# Patient Record
Sex: FEMALE | Race: WHITE | NOT HISPANIC OR LATINO
[De-identification: names, ages, dates, MRNs, and addresses within clinical notes are randomized per-mention and may not be internally consistent; named-entity substitution may affect disease eponyms.]

---

## 2017-02-16 ENCOUNTER — APPOINTMENT (OUTPATIENT)
Dept: OPHTHALMOLOGY | Facility: CLINIC | Age: 57
End: 2017-02-16

## 2017-03-07 ENCOUNTER — RX RENEWAL (OUTPATIENT)
Age: 57
End: 2017-03-07

## 2017-04-06 ENCOUNTER — APPOINTMENT (OUTPATIENT)
Dept: OPHTHALMOLOGY | Facility: CLINIC | Age: 57
End: 2017-04-06

## 2017-07-13 ENCOUNTER — APPOINTMENT (OUTPATIENT)
Dept: OPHTHALMOLOGY | Facility: CLINIC | Age: 57
End: 2017-07-13

## 2017-10-06 ENCOUNTER — APPOINTMENT (OUTPATIENT)
Dept: DERMATOLOGY | Facility: CLINIC | Age: 57
End: 2017-10-06
Payer: COMMERCIAL

## 2017-10-06 PROCEDURE — 99203 OFFICE O/P NEW LOW 30 MIN: CPT

## 2017-10-18 ENCOUNTER — RX RENEWAL (OUTPATIENT)
Age: 57
End: 2017-10-18

## 2017-11-09 ENCOUNTER — APPOINTMENT (OUTPATIENT)
Dept: OPHTHALMOLOGY | Facility: CLINIC | Age: 57
End: 2017-11-09
Payer: COMMERCIAL

## 2017-11-09 PROCEDURE — 92014 COMPRE OPH EXAM EST PT 1/>: CPT

## 2017-11-09 PROCEDURE — 92134 CPTRZ OPH DX IMG PST SGM RTA: CPT

## 2018-03-15 ENCOUNTER — APPOINTMENT (OUTPATIENT)
Dept: OPHTHALMOLOGY | Facility: CLINIC | Age: 58
End: 2018-03-15
Payer: COMMERCIAL

## 2018-03-15 DIAGNOSIS — Z79.899 OTHER LONG TERM (CURRENT) DRUG THERAPY: ICD-10-CM

## 2018-03-15 PROCEDURE — 92014 COMPRE OPH EXAM EST PT 1/>: CPT

## 2018-03-15 PROCEDURE — 92134 CPTRZ OPH DX IMG PST SGM RTA: CPT

## 2018-07-12 ENCOUNTER — APPOINTMENT (OUTPATIENT)
Dept: OPHTHALMOLOGY | Facility: CLINIC | Age: 58
End: 2018-07-12
Payer: COMMERCIAL

## 2018-07-12 DIAGNOSIS — H43.393 OTHER VITREOUS OPACITIES, BILATERAL: ICD-10-CM

## 2018-07-12 PROCEDURE — 92134 CPTRZ OPH DX IMG PST SGM RTA: CPT

## 2018-07-12 PROCEDURE — 92012 INTRM OPH EXAM EST PATIENT: CPT

## 2018-10-25 ENCOUNTER — RX RENEWAL (OUTPATIENT)
Age: 58
End: 2018-10-25

## 2018-11-01 ENCOUNTER — APPOINTMENT (OUTPATIENT)
Dept: OPHTHALMOLOGY | Facility: CLINIC | Age: 58
End: 2018-11-01
Payer: COMMERCIAL

## 2018-11-01 DIAGNOSIS — H30.93 UNSPECIFIED CHORIORETINAL INFLAMMATION, BILATERAL: ICD-10-CM

## 2018-11-01 DIAGNOSIS — H26.9 UNSPECIFIED CATARACT: ICD-10-CM

## 2018-11-01 PROCEDURE — 92134 CPTRZ OPH DX IMG PST SGM RTA: CPT

## 2018-11-01 PROCEDURE — 92012 INTRM OPH EXAM EST PATIENT: CPT

## 2019-01-15 ENCOUNTER — RX RENEWAL (OUTPATIENT)
Age: 59
End: 2019-01-15

## 2019-02-07 ENCOUNTER — APPOINTMENT (OUTPATIENT)
Dept: OPHTHALMOLOGY | Facility: CLINIC | Age: 59
End: 2019-02-07

## 2019-04-26 ENCOUNTER — RX RENEWAL (OUTPATIENT)
Age: 59
End: 2019-04-26

## 2019-05-22 ENCOUNTER — RX RENEWAL (OUTPATIENT)
Age: 59
End: 2019-05-22

## 2019-06-12 ENCOUNTER — APPOINTMENT (OUTPATIENT)
Dept: DERMATOLOGY | Facility: CLINIC | Age: 59
End: 2019-06-12

## 2019-06-13 ENCOUNTER — APPOINTMENT (OUTPATIENT)
Dept: OPHTHALMOLOGY | Facility: CLINIC | Age: 59
End: 2019-06-13
Payer: COMMERCIAL

## 2019-06-13 ENCOUNTER — NON-APPOINTMENT (OUTPATIENT)
Age: 59
End: 2019-06-13

## 2019-06-13 PROCEDURE — 92012 INTRM OPH EXAM EST PATIENT: CPT

## 2019-06-28 ENCOUNTER — APPOINTMENT (OUTPATIENT)
Dept: DERMATOLOGY | Facility: CLINIC | Age: 59
End: 2019-06-28
Payer: COMMERCIAL

## 2019-06-28 PROCEDURE — 99214 OFFICE O/P EST MOD 30 MIN: CPT

## 2019-09-19 ENCOUNTER — APPOINTMENT (OUTPATIENT)
Dept: OPHTHALMOLOGY | Facility: CLINIC | Age: 59
End: 2019-09-19
Payer: COMMERCIAL

## 2019-09-19 ENCOUNTER — NON-APPOINTMENT (OUTPATIENT)
Age: 59
End: 2019-09-19

## 2019-09-19 PROCEDURE — 92012 INTRM OPH EXAM EST PATIENT: CPT

## 2019-09-19 PROCEDURE — 92134 CPTRZ OPH DX IMG PST SGM RTA: CPT

## 2019-11-12 ENCOUNTER — NON-APPOINTMENT (OUTPATIENT)
Age: 59
End: 2019-11-12

## 2019-11-12 ENCOUNTER — APPOINTMENT (OUTPATIENT)
Dept: OPHTHALMOLOGY | Facility: CLINIC | Age: 59
End: 2019-11-12
Payer: COMMERCIAL

## 2019-11-12 PROCEDURE — 92014 COMPRE OPH EXAM EST PT 1/>: CPT

## 2020-01-16 ENCOUNTER — NON-APPOINTMENT (OUTPATIENT)
Age: 60
End: 2020-01-16

## 2020-01-16 ENCOUNTER — APPOINTMENT (OUTPATIENT)
Dept: OPHTHALMOLOGY | Facility: CLINIC | Age: 60
End: 2020-01-16
Payer: COMMERCIAL

## 2020-01-16 PROCEDURE — 92134 CPTRZ OPH DX IMG PST SGM RTA: CPT

## 2020-01-16 PROCEDURE — 92014 COMPRE OPH EXAM EST PT 1/>: CPT

## 2020-03-12 ENCOUNTER — NON-APPOINTMENT (OUTPATIENT)
Age: 60
End: 2020-03-12

## 2020-03-12 ENCOUNTER — APPOINTMENT (OUTPATIENT)
Dept: OPHTHALMOLOGY | Facility: CLINIC | Age: 60
End: 2020-03-12
Payer: COMMERCIAL

## 2020-03-12 PROCEDURE — 92012 INTRM OPH EXAM EST PATIENT: CPT

## 2020-03-12 PROCEDURE — 92134 CPTRZ OPH DX IMG PST SGM RTA: CPT

## 2020-04-29 ENCOUNTER — NON-APPOINTMENT (OUTPATIENT)
Age: 60
End: 2020-04-29

## 2020-04-29 ENCOUNTER — APPOINTMENT (OUTPATIENT)
Dept: OPHTHALMOLOGY | Facility: CLINIC | Age: 60
End: 2020-04-29
Payer: COMMERCIAL

## 2020-04-29 PROCEDURE — 99213 OFFICE O/P EST LOW 20 MIN: CPT | Mod: 95

## 2020-06-03 ENCOUNTER — NON-APPOINTMENT (OUTPATIENT)
Age: 60
End: 2020-06-03

## 2020-06-03 ENCOUNTER — APPOINTMENT (OUTPATIENT)
Dept: OPHTHALMOLOGY | Facility: CLINIC | Age: 60
End: 2020-06-03
Payer: COMMERCIAL

## 2020-06-03 PROCEDURE — 99212 OFFICE O/P EST SF 10 MIN: CPT | Mod: 95

## 2020-07-20 ENCOUNTER — APPOINTMENT (OUTPATIENT)
Dept: OPHTHALMOLOGY | Facility: CLINIC | Age: 60
End: 2020-07-20
Payer: COMMERCIAL

## 2020-07-20 ENCOUNTER — NON-APPOINTMENT (OUTPATIENT)
Age: 60
End: 2020-07-20

## 2020-07-20 PROCEDURE — 99441: CPT

## 2020-08-13 ENCOUNTER — RESULT REVIEW (OUTPATIENT)
Age: 60
End: 2020-08-13

## 2020-08-13 ENCOUNTER — APPOINTMENT (OUTPATIENT)
Dept: DERMATOLOGY | Facility: CLINIC | Age: 60
End: 2020-08-13
Payer: COMMERCIAL

## 2020-08-13 PROCEDURE — 11102 TANGNTL BX SKIN SINGLE LES: CPT | Mod: 59

## 2020-08-13 PROCEDURE — 67850 DSTRJ LESION LID MARGIN <1CM: CPT

## 2020-08-13 PROCEDURE — 99214 OFFICE O/P EST MOD 30 MIN: CPT | Mod: 25

## 2020-08-13 PROCEDURE — 17110 DESTRUCTION B9 LES UP TO 14: CPT

## 2020-09-27 ENCOUNTER — TRANSCRIPTION ENCOUNTER (OUTPATIENT)
Age: 60
End: 2020-09-27

## 2020-10-19 ENCOUNTER — APPOINTMENT (OUTPATIENT)
Dept: OPHTHALMOLOGY | Facility: CLINIC | Age: 60
End: 2020-10-19
Payer: COMMERCIAL

## 2020-10-19 ENCOUNTER — NON-APPOINTMENT (OUTPATIENT)
Age: 60
End: 2020-10-19

## 2020-10-19 PROCEDURE — 99441: CPT

## 2021-02-01 ENCOUNTER — APPOINTMENT (OUTPATIENT)
Dept: OPHTHALMOLOGY | Facility: CLINIC | Age: 61
End: 2021-02-01
Payer: COMMERCIAL

## 2021-02-01 ENCOUNTER — NON-APPOINTMENT (OUTPATIENT)
Age: 61
End: 2021-02-01

## 2021-02-01 PROCEDURE — 99442: CPT

## 2021-04-30 ENCOUNTER — APPOINTMENT (OUTPATIENT)
Dept: DERMATOLOGY | Facility: CLINIC | Age: 61
End: 2021-04-30
Payer: COMMERCIAL

## 2021-04-30 PROCEDURE — 99213 OFFICE O/P EST LOW 20 MIN: CPT

## 2021-04-30 PROCEDURE — 99072 ADDL SUPL MATRL&STAF TM PHE: CPT

## 2021-06-03 ENCOUNTER — APPOINTMENT (OUTPATIENT)
Dept: OPHTHALMOLOGY | Facility: CLINIC | Age: 61
End: 2021-06-03

## 2021-06-07 ENCOUNTER — NON-APPOINTMENT (OUTPATIENT)
Age: 61
End: 2021-06-07

## 2021-06-07 ENCOUNTER — APPOINTMENT (OUTPATIENT)
Dept: OPHTHALMOLOGY | Facility: CLINIC | Age: 61
End: 2021-06-07
Payer: COMMERCIAL

## 2021-06-07 PROCEDURE — 99442: CPT

## 2021-09-13 ENCOUNTER — APPOINTMENT (OUTPATIENT)
Dept: GASTROENTEROLOGY | Facility: CLINIC | Age: 61
End: 2021-09-13
Payer: COMMERCIAL

## 2021-09-13 VITALS
SYSTOLIC BLOOD PRESSURE: 100 MMHG | HEART RATE: 80 BPM | WEIGHT: 100 LBS | OXYGEN SATURATION: 99 % | HEIGHT: 65 IN | TEMPERATURE: 97.7 F | DIASTOLIC BLOOD PRESSURE: 60 MMHG | BODY MASS INDEX: 16.66 KG/M2

## 2021-09-13 DIAGNOSIS — Z12.11 ENCOUNTER FOR SCREENING FOR MALIGNANT NEOPLASM OF COLON: ICD-10-CM

## 2021-09-13 PROCEDURE — 99203 OFFICE O/P NEW LOW 30 MIN: CPT

## 2021-09-13 NOTE — ASSESSMENT
[FreeTextEntry1] : This is a 61-year-old female presenting for colon cancer screening evaluation due to family history of colon cancer.  I explained to her the risks, alternatives and benefits to a colonoscopy.  Risk including but not limited to bleeding, perforation, infection and adverse medication reaction.  Questions were answered.  She stated understanding.

## 2021-09-13 NOTE — HISTORY OF PRESENT ILLNESS
[FreeTextEntry1] : This is a pleasant 61-year-old female with history of retinitis presenting for colon cancer screening evaluation.  She has a family history of colon cancer in her mother.  She underwent a colonoscopy May 2016 with benign colon polyp.  She denies abdominal pain, nausea or vomiting.  She denies changes in bowel habits.  She denies rectal bleeding or melena.  She denies weight loss or anemia.

## 2021-09-15 ENCOUNTER — APPOINTMENT (OUTPATIENT)
Dept: DERMATOLOGY | Facility: CLINIC | Age: 61
End: 2021-09-15
Payer: COMMERCIAL

## 2021-09-15 PROCEDURE — 99213 OFFICE O/P EST LOW 20 MIN: CPT

## 2021-10-04 ENCOUNTER — APPOINTMENT (OUTPATIENT)
Dept: OPHTHALMOLOGY | Facility: CLINIC | Age: 61
End: 2021-10-04

## 2021-10-20 ENCOUNTER — APPOINTMENT (OUTPATIENT)
Dept: DISASTER EMERGENCY | Facility: CLINIC | Age: 61
End: 2021-10-20

## 2021-10-25 ENCOUNTER — APPOINTMENT (OUTPATIENT)
Dept: DISASTER EMERGENCY | Facility: CLINIC | Age: 61
End: 2021-10-25

## 2021-10-25 DIAGNOSIS — Z01.818 ENCOUNTER FOR OTHER PREPROCEDURAL EXAMINATION: ICD-10-CM

## 2021-10-25 LAB — SARS-COV-2 N GENE NPH QL NAA+PROBE: NOT DETECTED

## 2021-10-28 ENCOUNTER — APPOINTMENT (OUTPATIENT)
Dept: GASTROENTEROLOGY | Facility: HOSPITAL | Age: 61
End: 2021-10-28

## 2021-10-28 ENCOUNTER — OUTPATIENT (OUTPATIENT)
Dept: OUTPATIENT SERVICES | Facility: HOSPITAL | Age: 61
LOS: 1 days | End: 2021-10-28
Payer: COMMERCIAL

## 2021-10-28 VITALS
DIASTOLIC BLOOD PRESSURE: 75 MMHG | SYSTOLIC BLOOD PRESSURE: 117 MMHG | RESPIRATION RATE: 16 BRPM | HEART RATE: 85 BPM | OXYGEN SATURATION: 99 %

## 2021-10-28 VITALS
TEMPERATURE: 98 F | HEART RATE: 101 BPM | SYSTOLIC BLOOD PRESSURE: 123 MMHG | OXYGEN SATURATION: 100 % | DIASTOLIC BLOOD PRESSURE: 91 MMHG | WEIGHT: 100.97 LBS | RESPIRATION RATE: 17 BRPM | HEIGHT: 61 IN

## 2021-10-28 DIAGNOSIS — Z80.9 FAMILY HISTORY OF MALIGNANT NEOPLASM, UNSPECIFIED: ICD-10-CM

## 2021-10-28 DIAGNOSIS — Z98.890 OTHER SPECIFIED POSTPROCEDURAL STATES: Chronic | ICD-10-CM

## 2021-10-28 DIAGNOSIS — Z12.11 ENCOUNTER FOR SCREENING FOR MALIGNANT NEOPLASM OF COLON: ICD-10-CM

## 2021-10-28 DIAGNOSIS — Z98.49 CATARACT EXTRACTION STATUS, UNSPECIFIED EYE: Chronic | ICD-10-CM

## 2021-10-28 PROCEDURE — G0105: CPT

## 2021-10-28 PROCEDURE — 45378 DIAGNOSTIC COLONOSCOPY: CPT | Mod: GC

## 2021-10-28 RX ORDER — SODIUM CHLORIDE 9 MG/ML
500 INJECTION INTRAMUSCULAR; INTRAVENOUS; SUBCUTANEOUS
Refills: 0 | Status: DISCONTINUED | OUTPATIENT
Start: 2021-10-28 | End: 2021-11-12

## 2021-10-28 NOTE — ASU PATIENT PROFILE, ADULT - NSICDXPASTSURGICALHX_GEN_ALL_CORE_FT
PAST SURGICAL HISTORY:  H/O dilation and curettage     H/O eye surgery     S/P cataract surgery     S/P transesophageal echocardiogram ([facility])

## 2021-10-28 NOTE — ASU PATIENT PROFILE, ADULT - NSICDXPASTMEDICALHX_GEN_ALL_CORE_FT
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

## 2021-10-28 NOTE — PRE PROCEDURE NOTE - PRE PROCEDURE EVALUATION
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    S/P cataract surgery    S/P transesophageal echocardiogram ([facility])    H/O dilation and curettage    H/O eye surgery      ALLERGIES:  Allergy Status Unknown    HOME MEDICATIONS:  Cranberry: 650  orally 2 times a day  Estradiol Patch 0.025 mg/24 hours weekly transdermal film, extended release: 1 patch transdermal once a week  Low Dose ASA 81 mg oral tablet:   Metamucil: 5  orally 3 times a day  mycophenolate mofetil 500 mg oral tablet: 2 tab(s) orally 2 times a day  progesterone 100 mg oral capsule: orally once a day  SUMAtriptan 50 mg oral tablet: 1 tab(s) orally once, As Needed  vit c: 500   once a day  vit d: 2000    AICD/PPM: [ ] yes   [ ] no    PERTINENT LAB DATA:                      PHYSICAL EXAMINATION:    Height (cm): 154.9  Weight (kg): 45.8  BMI (kg/m2): 19.1  BSA (m2): 1.41T(C): 36.7  HR: 101  BP: 123/91  RR: 17  SpO2: 100%    Constitutional: NAD  HEENT: PERRLA, EOMI,    Neck:  No JVD  Respiratory: CTAB/L  Cardiovascular: S1 and S2  Gastrointestinal: BS+, soft, NT/ND  Extremities: No peripheral edema  Neurological: A/O x 3, no focal deficits  Psychiatric: Normal mood, normal affect  Skin: No rashes    ASA Class: I [ ]  II [ ]  III [ ]  IV [ ]    COMMENTS:    The patient is a suitable candidate for the planned procedure unless box checked [ ]  No, explain:

## 2021-11-09 PROBLEM — C44.91 BASAL CELL CARCINOMA OF SKIN, UNSPECIFIED: Chronic | Status: ACTIVE | Noted: 2021-10-28

## 2021-11-09 PROBLEM — M06.9 RHEUMATOID ARTHRITIS, UNSPECIFIED: Chronic | Status: ACTIVE | Noted: 2021-10-28

## 2021-12-09 ENCOUNTER — APPOINTMENT (OUTPATIENT)
Dept: OPHTHALMOLOGY | Facility: CLINIC | Age: 61
End: 2021-12-09
Payer: COMMERCIAL

## 2021-12-09 ENCOUNTER — NON-APPOINTMENT (OUTPATIENT)
Age: 61
End: 2021-12-09

## 2021-12-09 PROCEDURE — 92134 CPTRZ OPH DX IMG PST SGM RTA: CPT

## 2021-12-09 PROCEDURE — 92014 COMPRE OPH EXAM EST PT 1/>: CPT

## 2022-02-20 ENCOUNTER — NON-APPOINTMENT (OUTPATIENT)
Age: 62
End: 2022-02-20

## 2022-02-22 PROBLEM — Z80.3 FAMILY HISTORY OF MALIGNANT NEOPLASM OF BREAST: Status: ACTIVE | Noted: 2022-02-22

## 2022-02-22 PROBLEM — Z80.41 FAMILY HISTORY OF MALIGNANT NEOPLASM OF OVARY: Status: ACTIVE | Noted: 2022-02-22

## 2022-02-22 RX ORDER — MULTIVIT-MIN/IRON/FOLIC ACID/K 18-600-40
CAPSULE ORAL
Refills: 0 | Status: DISCONTINUED | COMMUNITY
End: 2022-02-22

## 2022-02-22 RX ORDER — DOXYCYCLINE 150 MG/1
CAPSULE ORAL
Refills: 0 | Status: DISCONTINUED | COMMUNITY
End: 2022-02-22

## 2022-02-22 RX ORDER — MYCOPHENOLATE MOFETIL 500 MG/1
500 TABLET ORAL DAILY
Qty: 90 | Refills: 5 | Status: DISCONTINUED | COMMUNITY
Start: 2017-03-07 | End: 2022-02-22

## 2022-02-22 RX ORDER — ESTRADIOL 0.03 MG/D
0.03 PATCH, EXTENDED RELEASE TRANSDERMAL
Refills: 0 | Status: DISCONTINUED | COMMUNITY
End: 2022-02-22

## 2022-02-23 ENCOUNTER — APPOINTMENT (OUTPATIENT)
Dept: GYNECOLOGIC ONCOLOGY | Facility: CLINIC | Age: 62
End: 2022-02-23
Payer: COMMERCIAL

## 2022-02-23 DIAGNOSIS — Z80.3 FAMILY HISTORY OF MALIGNANT NEOPLASM OF BREAST: ICD-10-CM

## 2022-02-23 DIAGNOSIS — Z80.41 FAMILY HISTORY OF MALIGNANT NEOPLASM OF OVARY: ICD-10-CM

## 2022-02-23 PROCEDURE — 76857 US EXAM PELVIC LIMITED: CPT | Mod: 59

## 2022-02-23 PROCEDURE — 76830 TRANSVAGINAL US NON-OB: CPT | Mod: 59

## 2022-02-23 PROCEDURE — 99204 OFFICE O/P NEW MOD 45 MIN: CPT | Mod: 25

## 2022-02-28 NOTE — PHYSICAL EXAM
[Chaperone Present] : A chaperone was present in the examining room during all aspects of the physical examination [Normal] : Bimanual Exam: Normal [FreeTextEntry1] : Shaniqua Simental PA-C

## 2022-02-28 NOTE — END OF VISIT
[FreeTextEntry3] : This note accurately reflects the work and decisions made by me.\par Written by Shaniqua Simental PA-C acting as a scribe for Dr. Adarsh Botello.

## 2022-02-28 NOTE — ASSESSMENT
[FreeTextEntry1] : 60 y/o with history of HRT with polypoid endometrium with simple and complex hyperplasia and focal cytologic atypia on Endometrial polyp. \par \par I discussed at length with the patient the nature, purpose, risks, benefits, and alternatives of Robot assisted total laparoscopic hysterectomy with bilateral salpingo-oophorectomy, retroperitoneal lymphadenectomy.  The patient understands the risks to include,but not be limited to, bowel injury, bleeding (with the possible need for transfusion), bladder or ureteral injury, infections, deep venous thrombosis, and mt-operative death.  The patient also understands that her surgery may not be able to be performed robotically and that she may need a laparotomy.  She also understands the limitations of robotic surgery and the possibility of missing a surgical complication with need for subsequent re-exploration.  She agrees to proceed.  She asked numerous questions which were answered to her satisfaction.  She understands the need for a pre-operative bowel preparation and agrees to comply with our instructions.  She also understands the rationale for a cystoscopy at the completion of the procedure and the potential risks of cystoscopy.  The patient also understands the possible limitations of robotic staging compared to a laparotomy approach.

## 2022-02-28 NOTE — HISTORY OF PRESENT ILLNESS
[FreeTextEntry1] : 62 y/o  female, LMP 2008 being referred by Dr. Jain for simple and complex hyperplasia. Patient reports she had a history of HRT (currently discontinued) for treatment of rare retinal eye disorder, which she was taking estradiol and progesterone for which she was getting yearly US. She reports 2 benign endometrial polypectomy last year. US  revealed thickened area, suspected endometrial polyp. On 22 she underwent fractional D&C, hysteroscopy and myosure excision of endometrial polyp. ECC - negative. EMC - weak proliferative endometrium. Myosure - polypoid endometrium with simple and complex hyperplasia with focal cytologic atypia. Denies vaginal bleeding/discharge, abdominal pain/bloating/distension, pelvis discomfort, changes in normal bowel/urinary habits, nausea/vomiting, unintentional weight loss/gain, and all other associated signs and symptoms. She is currently sexually active, denies pain and/or bleeding. 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 Patient is Ashkenazi [employer] heritage. \par \par Health Screening:\par Last Pap: 2021; normal as per patient \par Last Mammogram/US: ; normal. Patient reports history of enlarged right axillary lymph node which is stable as well as left breast calcifications.  \par Last Colonoscopy: 10/2021; normal as per patient \par Last Dexa:  ; osteopenia

## 2022-02-28 NOTE — CHIEF COMPLAINT
[FreeTextEntry1] : [place] [facility]\par \par [facility] [phone] at [address] [place] [address]

## 2022-03-04 ENCOUNTER — OUTPATIENT (OUTPATIENT)
Dept: OUTPATIENT SERVICES | Facility: HOSPITAL | Age: 62
LOS: 1 days | End: 2022-03-04
Payer: COMMERCIAL

## 2022-03-04 DIAGNOSIS — Z98.890 OTHER SPECIFIED POSTPROCEDURAL STATES: Chronic | ICD-10-CM

## 2022-03-04 DIAGNOSIS — Z98.49 CATARACT EXTRACTION STATUS, UNSPECIFIED EYE: Chronic | ICD-10-CM

## 2022-03-04 DIAGNOSIS — N85.02 ENDOMETRIAL INTRAEPITHELIAL NEOPLASIA [EIN]: ICD-10-CM

## 2022-03-07 ENCOUNTER — RESULT REVIEW (OUTPATIENT)
Age: 62
End: 2022-03-07

## 2022-03-07 PROCEDURE — 88321 CONSLTJ&REPRT SLD PREP ELSWR: CPT

## 2022-03-08 ENCOUNTER — OUTPATIENT (OUTPATIENT)
Dept: OUTPATIENT SERVICES | Facility: HOSPITAL | Age: 62
LOS: 1 days | End: 2022-03-08
Payer: COMMERCIAL

## 2022-03-08 VITALS
WEIGHT: 106.92 LBS | HEIGHT: 61 IN | DIASTOLIC BLOOD PRESSURE: 58 MMHG | RESPIRATION RATE: 16 BRPM | SYSTOLIC BLOOD PRESSURE: 99 MMHG | TEMPERATURE: 97 F | HEART RATE: 88 BPM

## 2022-03-08 DIAGNOSIS — Z98.890 OTHER SPECIFIED POSTPROCEDURAL STATES: Chronic | ICD-10-CM

## 2022-03-08 DIAGNOSIS — Z29.9 ENCOUNTER FOR PROPHYLACTIC MEASURES, UNSPECIFIED: ICD-10-CM

## 2022-03-08 DIAGNOSIS — N85.02 ENDOMETRIAL INTRAEPITHELIAL NEOPLASIA [EIN]: ICD-10-CM

## 2022-03-08 DIAGNOSIS — Z98.49 CATARACT EXTRACTION STATUS, UNSPECIFIED EYE: Chronic | ICD-10-CM

## 2022-03-08 DIAGNOSIS — Z01.818 ENCOUNTER FOR OTHER PREPROCEDURAL EXAMINATION: ICD-10-CM

## 2022-03-08 LAB
A1C WITH ESTIMATED AVERAGE GLUCOSE RESULT: 5.1 % — SIGNIFICANT CHANGE UP (ref 4–5.6)
ANION GAP SERPL CALC-SCNC: 14 MMOL/L — SIGNIFICANT CHANGE UP (ref 5–17)
BASOPHILS # BLD AUTO: 0.03 K/UL — SIGNIFICANT CHANGE UP (ref 0–0.2)
BASOPHILS NFR BLD AUTO: 0.5 % — SIGNIFICANT CHANGE UP (ref 0–2)
BLD GP AB SCN SERPL QL: SIGNIFICANT CHANGE UP
BUN SERPL-MCNC: 8.1 MG/DL — SIGNIFICANT CHANGE UP (ref 8–20)
CALCIUM SERPL-MCNC: 9.4 MG/DL — SIGNIFICANT CHANGE UP (ref 8.6–10.2)
CHLORIDE SERPL-SCNC: 102 MMOL/L — SIGNIFICANT CHANGE UP (ref 98–107)
CO2 SERPL-SCNC: 26 MMOL/L — SIGNIFICANT CHANGE UP (ref 22–29)
CREAT SERPL-MCNC: 0.63 MG/DL — SIGNIFICANT CHANGE UP (ref 0.5–1.3)
EGFR: 100 ML/MIN/1.73M2 — SIGNIFICANT CHANGE UP
EOSINOPHIL # BLD AUTO: 0.03 K/UL — SIGNIFICANT CHANGE UP (ref 0–0.5)
EOSINOPHIL NFR BLD AUTO: 0.5 % — SIGNIFICANT CHANGE UP (ref 0–6)
ESTIMATED AVERAGE GLUCOSE: 100 MG/DL — SIGNIFICANT CHANGE UP (ref 68–114)
GLUCOSE SERPL-MCNC: 97 MG/DL — SIGNIFICANT CHANGE UP (ref 70–99)
HCT VFR BLD CALC: 37.5 % — SIGNIFICANT CHANGE UP (ref 34.5–45)
HGB BLD-MCNC: 12.6 G/DL — SIGNIFICANT CHANGE UP (ref 11.5–15.5)
IMM GRANULOCYTES NFR BLD AUTO: 0.2 % — SIGNIFICANT CHANGE UP (ref 0–1.5)
LYMPHOCYTES # BLD AUTO: 2.14 K/UL — SIGNIFICANT CHANGE UP (ref 1–3.3)
LYMPHOCYTES # BLD AUTO: 37.5 % — SIGNIFICANT CHANGE UP (ref 13–44)
MCHC RBC-ENTMCNC: 28.8 PG — SIGNIFICANT CHANGE UP (ref 27–34)
MCHC RBC-ENTMCNC: 33.6 GM/DL — SIGNIFICANT CHANGE UP (ref 32–36)
MCV RBC AUTO: 85.6 FL — SIGNIFICANT CHANGE UP (ref 80–100)
MONOCYTES # BLD AUTO: 0.59 K/UL — SIGNIFICANT CHANGE UP (ref 0–0.9)
MONOCYTES NFR BLD AUTO: 10.4 % — SIGNIFICANT CHANGE UP (ref 2–14)
NEUTROPHILS # BLD AUTO: 2.9 K/UL — SIGNIFICANT CHANGE UP (ref 1.8–7.4)
NEUTROPHILS NFR BLD AUTO: 50.9 % — SIGNIFICANT CHANGE UP (ref 43–77)
PLATELET # BLD AUTO: 258 K/UL — SIGNIFICANT CHANGE UP (ref 150–400)
POTASSIUM SERPL-MCNC: 4.3 MMOL/L — SIGNIFICANT CHANGE UP (ref 3.5–5.3)
POTASSIUM SERPL-SCNC: 4.3 MMOL/L — SIGNIFICANT CHANGE UP (ref 3.5–5.3)
RBC # BLD: 4.38 M/UL — SIGNIFICANT CHANGE UP (ref 3.8–5.2)
RBC # FLD: 11.8 % — SIGNIFICANT CHANGE UP (ref 10.3–14.5)
SODIUM SERPL-SCNC: 142 MMOL/L — SIGNIFICANT CHANGE UP (ref 135–145)
SURGICAL PATHOLOGY STUDY: SIGNIFICANT CHANGE UP
WBC # BLD: 5.7 K/UL — SIGNIFICANT CHANGE UP (ref 3.8–10.5)
WBC # FLD AUTO: 5.7 K/UL — SIGNIFICANT CHANGE UP (ref 3.8–10.5)

## 2022-03-08 PROCEDURE — 93005 ELECTROCARDIOGRAM TRACING: CPT

## 2022-03-08 PROCEDURE — G0463: CPT

## 2022-03-08 PROCEDURE — 93010 ELECTROCARDIOGRAM REPORT: CPT

## 2022-03-08 RX ORDER — MYCOPHENOLATE MOFETIL 250 MG/1
2 CAPSULE ORAL
Qty: 0 | Refills: 0 | DISCHARGE

## 2022-03-08 RX ORDER — PROGESTERONE 200 MG/1
0 CAPSULE, LIQUID FILLED ORAL
Qty: 0 | Refills: 0 | DISCHARGE

## 2022-03-08 RX ORDER — ASPIRIN/CALCIUM CARB/MAGNESIUM 324 MG
0 TABLET ORAL
Qty: 0 | Refills: 0 | DISCHARGE

## 2022-03-08 NOTE — H&P PST ADULT - ASSESSMENT
Pt 62 y/o  female, LMP , seen today pre-op for Robotic assisted total laparoscopic hysterectomy, bilateral salpingo-oophorectomy, sentinel lymph node mapping, cystoscopy , Robotic code. Pt was referred by her OB-GYN, Dr. Jain for simple and complex hyperplasia. Pt report she had a history of HRT (which is currently discontinued) for treatment of rare retinal eye disorder, which she was taking estradiol and progesterone. Subsequently Pt was getting yearly US. On  revealed thickened area, suspected endometrial polyp.  On 22 Pt underwent  D&C, hysteroscopy and Myosure excision of endometrial polyp. Myosure - polypoid endometrium with simple and complex hyperplasia with focal cytologic atypia. Pt to denies vaginal bleeding and discharge, denies abdominal pain/bloating/ pelvis discomfort, denies dyspareunia,  denies changes in normal bowel and bladder habits, denies nausea/vomiting, denies unintentional weight loss and any other related issues. Pt seen today for a scheduled procedure on 3/22/22 with Dr. Botello. Surgery protocol reviewed with pt today. Pt to follow-up with PCP for medical clearance and her Ophthalmologist dr Ramírez regarding recommendation of Mycophenolate prior to this procedure             Pt 60 y/o  female, LMP , seen today pre-op for Robotic assisted total laparoscopic hysterectomy, bilateral salpingo-oophorectomy, sentinel lymph node mapping, cystoscopy , Robotic code. Pt was referred by her OB-GYN, Dr. Jain for simple and complex hyperplasia. Pt report she had a history of HRT (which is currently discontinued) for treatment of rare retinal eye disorder, which she was taking estradiol and progesterone. Subsequently Pt was getting yearly US. On  revealed thickened area, suspected endometrial polyp.  On 22 Pt underwent  D&C, hysteroscopy and Myosure excision of endometrial polyp. Myosure - polypoid endometrium with simple and complex hyperplasia with focal cytologic atypia. Pt to denies vaginal bleeding and discharge, denies abdominal pain/bloating/ pelvis discomfort, denies dyspareunia,  denies changes in normal bowel and bladder habits, denies nausea/vomiting, denies unintentional weight loss and any other related issues. Pt seen today for a scheduled procedure on 3/22/22 with Dr. Botello. Surgery protocol reviewed with pt today. Pt to follow-up with PCP for medical clearance and her Ophthalmologist dr Ramírez regarding recommendation of Mycophenolate prior to this procedure                   Pt 62 y/o  female, LMP , seen today pre-op for Robotic assisted total laparoscopic hysterectomy, bilateral salpingo-oophorectomy, sentinel lymph node mapping, cystoscopy , Robotic code. Pt was referred by her OB-GYN, Dr. Jain for simple and complex hyperplasia. Pt report she had a history of HRT (which is currently discontinued) for treatment of rare retinal eye disorder, which she was taking estradiol and progesterone. Subsequently Pt was getting yearly US. On  revealed thickened area, suspected endometrial polyp.  On 22 Pt underwent  D&C, hysteroscopy and Myosure excision of endometrial polyp. Myosure - polypoid endometrium with simple and complex hyperplasia with focal cytologic atypia. Pt to denies vaginal bleeding and discharge, denies abdominal pain/bloating/ pelvis discomfort, denies dyspareunia,  denies changes in normal bowel and bladder habits, denies nausea/vomiting, denies unintentional weight loss and any other related issues. Pt seen today for a scheduled procedure on 3/22/22 with Dr. Botello. Surgery protocol reviewed with pt today. Pt to follow-up with PCP for medical clearance and her Ophthalmologist dr Ramírez regarding recommendation of Mycophenolate prior to this procedure   CAPRINI VTE 2.0 SCORE [CLOT updated 2019]    AGE RELATED RISK FACTORS                                                       MOBILITY RELATED FACTORS  [ ] Age 41-60 years                                            (1 Point)                    [ ] Bed rest                                                        (1 Point)  [x ] Age: 61-74 years                                           (2 Points)                  [ ] Plaster cast                                                   (2 Points)  [ ] Age= 75 years                                              (3 Points)                    [ ] Bed bound for more than 72 hours                 (2 Points)    DISEASE RELATED RISK FACTORS                                               GENDER SPECIFIC FACTORS  [ ] Edema in the lower extremities                       (1 Point)              [ ] Pregnancy                                                     (1 Point)  [ ] Varicose veins                                               (1 Point)                     [ ] Post-partum < 6 weeks                                   (1 Point)             [x ] BMI > 25 Kg/m2                                            (1 Point)                     [ ] Hormonal therapy  or oral contraception          (1 Point)                 [ ] Sepsis (in the previous month)                        (1 Point)               [ ] History of pregnancy complications                 (1 point)  [ ] Pneumonia or serious lung disease                                               [ ] Unexplained or recurrent                     (1 Point)           (in the previous month)                               (1 Point)  [ ] Abnormal pulmonary function test                     (1 Point)                 SURGERY RELATED RISK FACTORS  [ ] Acute myocardial infarction                              (1 Point)               [ ]  Section                                             (1 Point)  [ ] Congestive heart failure (in the previous month)  (1 Point)      [ ] Minor surgery                                                  (1 Point)   [ ] Inflammatory bowel disease                             (1 Point)               [ ] Arthroscopic surgery                                        (2 Points)  [ ] Central venous access                                      (2 Points)                [ x] General surgery lasting more than 45 minutes (2 points)  [ ] Malignancy- Present or previous                   (2 Points)                [ ] Elective arthroplasty                                         (5 points)    [ ] Stroke (in the previous month)                          (5 Points)                                                                                                                                                           HEMATOLOGY RELATED FACTORS                                                 TRAUMA RELATED RISK FACTORS  [ ] Prior episodes of VTE                                     (3 Points)                [ ] Fracture of the hip, pelvis, or leg                       (5 Points)  [ ] Positive family history for VTE                         (3 Points)             [ ] Acute spinal cord injury (in the previous month)  (5 Points)  [ ] Prothrombin 48370 A                                     (3 Points)               [ ] Paralysis  (less than 1 month)                             (5 Points)  [ ] Factor V Leiden                                             (3 Points)                  [ ] Multiple Trauma within 1 month                        (5 Points)  [ ] Lupus anticoagulants                                     (3 Points)                                                           [ ] Anticardiolipin antibodies                               (3 Points)                                                       [ ] High homocysteine in the blood                      (3 Points)                                             [ ] Other congenital or acquired thrombophilia      (3 Points)                                                [ ] Heparin induced thrombocytopenia                  (3 Points)                                     Total Score [     5     ]  OPIOID RISK TOOL    SEEMA EACH BOX THAT APPLIES AND ADD TOTALS AT THE END    FAMILY HISTORY OF SUBSTANCE ABUSE                 FEMALE         MALE                                                Alcohol                             [  ]1 pt          [  ]3pts                                               Illegal Durgs                     [  ]2 pts        [  ]3pts                                               Rx Drugs                           [  ]4 pts        [  ]4 pts    PERSONAL HISTORY OF SUBSTANCE ABUSE                                                                                          Alcohol                             [  ]3 pts       [  ]3 pts                                               Illegal Drugs                     [  ]4 pts        [  ]4 pts                                               Rx Drugs                           [  ]5 pts        [  ]5 pts    AGE BETWEEN 16-45 YEARS                                      [  ]1 pt         [  ]1 pt    HISTORY OF PREADOLESCENT   SEXUAL ABUSE                                                             [  ]3 pts        [  ]0pts    PSYCHOLOGICAL DISEASE                     ADD, OCD, Bipolar, Schizophrenia        [  ]2 pts         [  ]2 pts                      Depression                                               [  ]1 pt           [  ]1 pt           SCORING TOTAL   (add numbers and type here)              (**0*)                                     A score of 3 or lower indicated LOW risk for future opioid abuse  A score of 4 to 7 indicated moderate risk for future opioid abuse  A score of 8 or higher indicates a high risk for opioid abuse                  Pt 62 y/o  female, LMP 2008, seen today pre-op for Robotic assisted total laparoscopic hysterectomy, bilateral salpingo-oophorectomy, sentinel lymph node mapping, cystoscopy , Robotic code. Pt was referred by her OB-GYN, Dr. Jain for simple and complex hyperplasia. Pt report she had a history of HRT (which is currently discontinued) for treatment of rare retinal eye disorder, which she was taking estradiol and progesterone. Subsequently Pt was getting yearly US. On  revealed thickened area, suspected endometrial polyp.  On 22 Pt underwent  D&C, hysteroscopy and Myosure excision of endometrial polyp. Myosure - polypoid endometrium with simple and complex hyperplasia with focal cytologic atypia. Pt today denies vaginal bleeding and discharge, denies abdominal pain/bloating/ pelvis discomfort, denies dyspareunia,  denies changes in normal bowel and bladder habits, denies nausea/vomiting, denies unintentional weight loss and any other related issues. Pt seen today for a scheduled procedure on 3/22/22 with Dr. Botello. Surgery protocol reviewed with pt today. Pt to follow-up with PCP for medical clearance and her Ophthalmologist dr Ramírez regarding recommendation of Mycophenolate prior to this procedure   CAPRINI VTE 2.0 SCORE [CLOT updated 2019]    AGE RELATED RISK FACTORS                                                       MOBILITY RELATED FACTORS  [ ] Age 41-60 years                                            (1 Point)                    [ ] Bed rest                                                        (1 Point)  [x ] Age: 61-74 years                                           (2 Points)                  [ ] Plaster cast                                                   (2 Points)  [ ] Age= 75 years                                              (3 Points)                    [ ] Bed bound for more than 72 hours                 (2 Points)    DISEASE RELATED RISK FACTORS                                               GENDER SPECIFIC FACTORS  [ ] Edema in the lower extremities                       (1 Point)              [ ] Pregnancy                                                     (1 Point)  [ ] Varicose veins                                               (1 Point)                     [ ] Post-partum < 6 weeks                                   (1 Point)             [x ] BMI > 25 Kg/m2                                            (1 Point)                     [ ] Hormonal therapy  or oral contraception          (1 Point)                 [ ] Sepsis (in the previous month)                        (1 Point)               [ ] History of pregnancy complications                 (1 point)  [ ] Pneumonia or serious lung disease                                               [ ] Unexplained or recurrent                     (1 Point)           (in the previous month)                               (1 Point)  [ ] Abnormal pulmonary function test                     (1 Point)                 SURGERY RELATED RISK FACTORS  [ ] Acute myocardial infarction                              (1 Point)               [ ]  Section                                             (1 Point)  [ ] Congestive heart failure (in the previous month)  (1 Point)      [ ] Minor surgery                                                  (1 Point)   [ ] Inflammatory bowel disease                             (1 Point)               [ ] Arthroscopic surgery                                        (2 Points)  [ ] Central venous access                                      (2 Points)                [ x] General surgery lasting more than 45 minutes (2 points)  [ ] Malignancy- Present or previous                   (2 Points)                [ ] Elective arthroplasty                                         (5 points)    [ ] Stroke (in the previous month)                          (5 Points)                                                                                                                                                           HEMATOLOGY RELATED FACTORS                                                 TRAUMA RELATED RISK FACTORS  [ ] Prior episodes of VTE                                     (3 Points)                [ ] Fracture of the hip, pelvis, or leg                       (5 Points)  [ ] Positive family history for VTE                         (3 Points)             [ ] Acute spinal cord injury (in the previous month)  (5 Points)  [ ] Prothrombin 46471 A                                     (3 Points)               [ ] Paralysis  (less than 1 month)                             (5 Points)  [ ] Factor V Leiden                                             (3 Points)                  [ ] Multiple Trauma within 1 month                        (5 Points)  [ ] Lupus anticoagulants                                     (3 Points)                                                           [ ] Anticardiolipin antibodies                               (3 Points)                                                       [ ] High homocysteine in the blood                      (3 Points)                                             [ ] Other congenital or acquired thrombophilia      (3 Points)                                                [ ] Heparin induced thrombocytopenia                  (3 Points)                                     Total Score [     5     ]  OPIOID RISK TOOL    SEEMA EACH BOX THAT APPLIES AND ADD TOTALS AT THE END    FAMILY HISTORY OF SUBSTANCE ABUSE                 FEMALE         MALE                                                Alcohol                             [  ]1 pt          [  ]3pts                                               Illegal Durgs                     [  ]2 pts        [  ]3pts                                               Rx Drugs                           [  ]4 pts        [  ]4 pts    PERSONAL HISTORY OF SUBSTANCE ABUSE                                                                                          Alcohol                             [  ]3 pts       [  ]3 pts                                               Illegal Drugs                     [  ]4 pts        [  ]4 pts                                               Rx Drugs                           [  ]5 pts        [  ]5 pts    AGE BETWEEN 16-45 YEARS                                      [  ]1 pt         [  ]1 pt    HISTORY OF PREADOLESCENT   SEXUAL ABUSE                                                             [  ]3 pts        [  ]0pts    PSYCHOLOGICAL DISEASE                     ADD, OCD, Bipolar, Schizophrenia        [  ]2 pts         [  ]2 pts                      Depression                                               [  ]1 pt           [  ]1 pt           SCORING TOTAL   (add numbers and type here)              (**0*)                                     A score of 3 or lower indicated LOW risk for future opioid abuse  A score of 4 to 7 indicated moderate risk for future opioid abuse  A score of 8 or higher indicates a high risk for opioid abuse

## 2022-03-08 NOTE — H&P PST ADULT - NSICDXPASTMEDICALHX_GEN_ALL_CORE_FT
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

## 2022-03-08 NOTE — H&P PST ADULT - SOURCE OF INFORMATION, PROFILE
Dr Lemon Dr Lemon RN, surgery PA, CM RN, surgery team patient pt pt rn, reza and patient pt, CM, RN patient

## 2022-03-08 NOTE — PATIENT PROFILE ADULT - FALL HARM RISK - UNIVERSAL INTERVENTIONS
Bed in lowest position, wheels locked, appropriate side rails in place/Call bell, personal items and telephone in reach/Instruct patient to call for assistance before getting out of bed or chair/Non-slip footwear when patient is out of bed/[place] to call system/Physically safe environment - no spills, clutter or unnecessary equipment/Purposeful Proactive Rounding/Room/bathroom lighting operational, light cord in reach

## 2022-03-08 NOTE — H&P PST ADULT - PROBLEM SELECTOR PLAN 2
Robotic assisted total laparoscopic hysterectomy, bilateral salpingo-oophorectomy, sentinel lymph node mapping, cystoscopy , Robotic code. F/U with PCP for medical clearance

## 2022-03-08 NOTE — H&P PST ADULT - NSICDXFAMILYHX_GEN_ALL_CORE_FT
FAMILY HISTORY:  Mother  Still living? Unknown  FHx: colon cancer, Age at diagnosis: Age Unknown  FHx: Crohn's disease, Age at diagnosis: Age Unknown    Grandparent  Still living? Unknown  Family history of breast cancer, Age at diagnosis: Age Unknown    Aunt  Still living? Unknown  Family history of breast cancer, Age at diagnosis: Age Unknown

## 2022-03-08 NOTE — H&P PST ADULT - HISTORY OF PRESENT ILLNESS
Pt 60 y/o  female, LMP , seen today pre-op for Robotic assisted total laparoscopic hysterectomy, bilateral salpingo-oophorectomy, sentinel lymph node mapping, cystoscopy , Robotic code. Pt was referred by her OB-GYN, Dr. Jain for simple and complex hyperplasia. Pt report she had a history of HRT (which is currently discontinued) for treatment of rare retinal eye disorder, which she was taking estradiol and progesterone. Subsequently Pt was getting yearly US. On  revealed thickened area, suspected endometrial polyp.  On 22 Pt underwent  D&C, hysteroscopy and Myosure excision of endometrial polyp. Myosure - polypoid endometrium with simple and complex hyperplasia with focal cytologic atypia. Pt to denies vaginal bleeding and discharge, denies abdominal pain/bloating/ pelvis discomfort, denies dyspareunia,  denies changes in normal bowel and bladder habits, denies nausea/vomiting, denies unintentional weight loss and any other related issues. Pt seen today for a scheduled procedure on 3/22/22 with Dr. Botello        Pt 62 y/o  female, LMP , seen today pre-op for Robotic assisted total laparoscopic hysterectomy, bilateral salpingo-oophorectomy, sentinel lymph node mapping, cystoscopy , Robotic code. Pt was referred by her OB-GYN, Dr. Jain for simple and complex hyperplasia. Pt report she had a history of HRT (which is currently discontinued) for treatment of rare retinal eye disorder, which she was taking estradiol and progesterone. Subsequently Pt was getting yearly US. On  revealed thickened area, suspected endometrial polyp.  On 22 Pt underwent  D&C, hysteroscopy and Myosure excision of endometrial polyp. Myosure - polypoid endometrium with simple and complex hyperplasia with focal cytologic atypia. Pt to denies vaginal bleeding and discharge, denies abdominal pain/bloating/ pelvis discomfort, denies dyspareunia,  denies changes in normal bowel and bladder habits, denies nausea/vomiting, denies unintentional weight loss and any other related issues. Pt seen today for a scheduled procedure on 3/22/22 with Dr. Botello            Pt 62 y/o  female, LMP , seen today pre-op for Robotic assisted total laparoscopic hysterectomy, bilateral salpingo-oophorectomy, sentinel lymph node mapping, cystoscopy , Robotic code. Pt was referred by her OB-GYN, Dr. Jain for simple and complex hyperplasia. Pt report she had a history of HRT (which is currently discontinued) for treatment of rare retinal eye disorder, which she was taking estradiol and progesterone. Subsequently Pt was getting yearly US. On  revealed thickened area, suspected endometrial polyp.  On 22 Pt underwent  D&C, hysteroscopy and Myosure excision of endometrial polyp. Myosure - polypoid endometrium with simple and complex hyperplasia with focal cytologic atypia. Pt today denies vaginal bleeding and discharge, denies abdominal pain/bloating/ pelvis discomfort, denies dyspareunia,  denies changes in normal bowel and bladder habits, denies nausea/vomiting, denies unintentional weight loss and any other related issues. Pt seen today for a scheduled procedure on 3/22/22 with Dr. Botello

## 2022-03-08 NOTE — H&P PST ADULT - NSICDXPASTSURGICALHX_GEN_ALL_CORE_FT
PAST SURGICAL HISTORY:  H/O dilation and curettage     H/O eye surgery     S/P cataract surgery     S/P transesophageal echocardiogram ([facility])      PAST SURGICAL HISTORY:  H/O dilation and curettage     H/O eye surgery     S/P cataract surgery left eye 2016    S/P transesophageal echocardiogram ([facility])

## 2022-03-21 ENCOUNTER — RESULT REVIEW (OUTPATIENT)
Age: 62
End: 2022-03-21

## 2022-03-22 ENCOUNTER — TRANSCRIPTION ENCOUNTER (OUTPATIENT)
Age: 62
End: 2022-03-22

## 2022-03-22 ENCOUNTER — OUTPATIENT (OUTPATIENT)
Dept: OUTPATIENT SERVICES | Facility: HOSPITAL | Age: 62
LOS: 1 days | End: 2022-03-22
Payer: COMMERCIAL

## 2022-03-22 ENCOUNTER — RESULT REVIEW (OUTPATIENT)
Age: 62
End: 2022-03-22

## 2022-03-22 DIAGNOSIS — Z98.49 CATARACT EXTRACTION STATUS, UNSPECIFIED EYE: Chronic | ICD-10-CM

## 2022-03-22 DIAGNOSIS — Z98.890 OTHER SPECIFIED POSTPROCEDURAL STATES: Chronic | ICD-10-CM

## 2022-03-22 PROCEDURE — 58571 TLH W/T/O 250 G OR LESS: CPT

## 2022-03-22 PROCEDURE — 38570 LAPAROSCOPY LYMPH NODE BIOP: CPT

## 2022-03-22 PROCEDURE — 38900 IO MAP OF SENT LYMPH NODE: CPT | Mod: 50

## 2022-03-22 PROCEDURE — 38900 IO MAP OF SENT LYMPH NODE: CPT | Mod: 80,50

## 2022-03-22 PROCEDURE — 58571 TLH W/T/O 250 G OR LESS: CPT | Mod: 80

## 2022-03-22 PROCEDURE — 88307 TISSUE EXAM BY PATHOLOGIST: CPT | Mod: 26

## 2022-03-22 PROCEDURE — 38570 LAPAROSCOPY LYMPH NODE BIOP: CPT | Mod: 80

## 2022-03-22 RX ORDER — PHYTONADIONE (VIT K1) 5 MG
0 TABLET ORAL
Qty: 0 | Refills: 0 | DISCHARGE

## 2022-03-22 RX ORDER — PSYLLIUM SEED (WITH DEXTROSE)
5 POWDER (GRAM) ORAL
Qty: 0 | Refills: 0 | DISCHARGE

## 2022-03-22 RX ORDER — CHOLECALCIFEROL (VITAMIN D3) 125 MCG
1 CAPSULE ORAL
Qty: 0 | Refills: 0 | DISCHARGE

## 2022-03-22 RX ORDER — MYCOPHENOLATE MOFETIL 250 MG/1
1000 CAPSULE ORAL
Qty: 0 | Refills: 0 | DISCHARGE

## 2022-03-22 RX ORDER — SUMATRIPTAN SUCCINATE 4 MG/.5ML
1 INJECTION, SOLUTION SUBCUTANEOUS
Qty: 0 | Refills: 0 | DISCHARGE

## 2022-03-22 RX ORDER — ASCORBIC ACID 60 MG
1 TABLET,CHEWABLE ORAL
Qty: 0 | Refills: 0 | DISCHARGE

## 2022-03-23 ENCOUNTER — TRANSCRIPTION ENCOUNTER (OUTPATIENT)
Age: 62
End: 2022-03-23

## 2022-03-23 DIAGNOSIS — N85.02 ENDOMETRIAL INTRAEPITHELIAL NEOPLASIA [EIN]: ICD-10-CM

## 2022-03-23 PROBLEM — K58.9 IRRITABLE BOWEL SYNDROME WITHOUT DIARRHEA: Chronic | Status: ACTIVE | Noted: 2022-03-08

## 2022-03-23 PROBLEM — M51.26 OTHER INTERVERTEBRAL DISC DISPLACEMENT, LUMBAR REGION: Chronic | Status: ACTIVE | Noted: 2022-03-08

## 2022-03-23 PROCEDURE — 36415 COLL VENOUS BLD VENIPUNCTURE: CPT

## 2022-03-23 PROCEDURE — 88307 TISSUE EXAM BY PATHOLOGIST: CPT

## 2022-03-23 PROCEDURE — 88112 CYTOPATH CELL ENHANCE TECH: CPT

## 2022-03-23 PROCEDURE — S2900: CPT

## 2022-03-23 PROCEDURE — 82962 GLUCOSE BLOOD TEST: CPT

## 2022-03-23 PROCEDURE — 58548 LAP RADICAL HYST: CPT

## 2022-03-23 PROCEDURE — 85025 COMPLETE CBC W/AUTO DIFF WBC: CPT

## 2022-03-23 PROCEDURE — 80048 BASIC METABOLIC PNL TOTAL CA: CPT

## 2022-03-29 ENCOUNTER — NON-APPOINTMENT (OUTPATIENT)
Age: 62
End: 2022-03-29

## 2022-04-13 ENCOUNTER — APPOINTMENT (OUTPATIENT)
Dept: GYNECOLOGIC ONCOLOGY | Facility: CLINIC | Age: 62
End: 2022-04-13
Payer: COMMERCIAL

## 2022-04-13 VITALS
BODY MASS INDEX: 16.66 KG/M2 | RESPIRATION RATE: 16 BRPM | OXYGEN SATURATION: 100 % | DIASTOLIC BLOOD PRESSURE: 77 MMHG | HEART RATE: 91 BPM | HEIGHT: 65 IN | WEIGHT: 100 LBS | SYSTOLIC BLOOD PRESSURE: 124 MMHG

## 2022-04-13 DIAGNOSIS — N85.02 ENDOMETRIAL INTRAEPITHELIAL NEOPLASIA [EIN]: ICD-10-CM

## 2022-04-13 PROCEDURE — 99024 POSTOP FOLLOW-UP VISIT: CPT

## 2022-04-18 PROBLEM — N85.02 COMPLEX ATYPICAL ENDOMETRIAL HYPERPLASIA: Status: ACTIVE | Noted: 2022-02-23

## 2022-04-22 NOTE — REASON FOR VISIT
[Post Op] : post op visit [de-identified] : 3/22/22 [de-identified] : [place] Robot Assisted Total Laparoscopic Hysterectomy, Bilateral Salpingo-oophorectomy, \par Bilateral Pelvic and Para-aortic [place] Lymph Node Dissection, Cystoscopy, Pelvic Washings for Atypical Endometrial Hyperplasia at [facility].  [de-identified] : The patient is healing remarkably well. She denies a change in appetite, or a change in weight. She is tolerating PO without nausea or vomiting. She denies VB/VD. She denies CP/SOB. Patient admits to hot flashes.

## 2022-04-22 NOTE — END OF VISIT
[FreeTextEntry3] : Written by Kari Sullivan, acting as a scribe for Dr. Adarsh Botello.\par This note accurately reflects the work and decisions made by me.

## 2022-04-22 NOTE — ASSESSMENT
[FreeTextEntry1] : The patient is healing well without complication. We discussed ongoing recuperation and reviewed final pathology results in detail. We reviewed the need for ongoing GYN visits and a recommended plan to follow up with regular GYN. Patient with surgically induced menopause. I d/w patient that she can f/u with her gynecologist if she is interested in pursuing hormone replacement therapy. The patient stated and expressed a good understanding of the above information. All of her questions were answered to her apparent satisfaction. \par \par

## 2022-04-22 NOTE — DISCUSSION/SUMMARY
[Clean] : was clean [Intact] : was intact [None] : had no drainage [Normal Skin] : normal appearance [Dry] : was not dry [Erythema] : was not erythematous [Ecchymosis] : was not ecchymotic [Seroma] : had no seroma [Firm] : soft [Tender] : nontender [Abnormal Bowel Sounds] : normal bowel sounds [Rebound] : no rebound tenderness [Guarding] : no guarding [Incisional Hernia] : no incisional hernia [Mass] : no palpable mass [External Genitalia Abnormal] : normal external genitalia [Vaginal Exam Abnormal] : normal vaginal exam [de-identified] : Kari Sullivan MA was present the entire time of gynecological exam.  [FreeTextEntry1] : Pertinent Findings: Small anteverted uterus, normal tubes and ovaries, normal upper abdomen and \par cystoscopy, precaval and bilateral hypogastric sentinel nodes removed.\par \par Pathology: \par 1  Paraaortic sentinel lymph node:\par \par - Four lymph nodes, negative for metastatic carcinoma (0/4).\par 2  Right pelvic sentinel lymph node;\par - Two lymph nodes, negative for metastatic carcinoma (0/2).\par 3  Left pelvic sentinel lymph node:\par - Two lymph nodes, negative for metastatic carcinoma (0/2).\par 4  Uterus, cervix, bilateral tubes and ovaries;\par - Uterus with focal complex hyperplasia with atypia involving\par endometrium.\par - Negative cervix and negative lower uterine segment.\par - Histologically unremarkable fallopian tubes and ovaries as well as left\par and right parametria.\par \par PW negative

## 2022-06-23 ENCOUNTER — NON-APPOINTMENT (OUTPATIENT)
Age: 62
End: 2022-06-23

## 2022-06-23 ENCOUNTER — APPOINTMENT (OUTPATIENT)
Dept: OPHTHALMOLOGY | Facility: CLINIC | Age: 62
End: 2022-06-23

## 2022-06-23 PROCEDURE — 92012 INTRM OPH EXAM EST PATIENT: CPT

## 2022-06-23 PROCEDURE — 92134 CPTRZ OPH DX IMG PST SGM RTA: CPT

## 2022-08-29 ENCOUNTER — NON-APPOINTMENT (OUTPATIENT)
Age: 62
End: 2022-08-29

## 2022-08-30 ENCOUNTER — NON-APPOINTMENT (OUTPATIENT)
Age: 62
End: 2022-08-30

## 2022-08-30 ENCOUNTER — APPOINTMENT (OUTPATIENT)
Dept: CARDIOLOGY | Facility: CLINIC | Age: 62
End: 2022-08-30

## 2022-08-30 VITALS
OXYGEN SATURATION: 100 % | BODY MASS INDEX: 19.26 KG/M2 | SYSTOLIC BLOOD PRESSURE: 116 MMHG | HEART RATE: 70 BPM | DIASTOLIC BLOOD PRESSURE: 64 MMHG | WEIGHT: 102 LBS | HEIGHT: 61 IN

## 2022-08-30 VITALS
HEIGHT: 61 IN | RESPIRATION RATE: 16 BRPM | DIASTOLIC BLOOD PRESSURE: 68 MMHG | WEIGHT: 102 LBS | HEART RATE: 70 BPM | TEMPERATURE: 98.9 F | BODY MASS INDEX: 19.26 KG/M2 | OXYGEN SATURATION: 100 % | SYSTOLIC BLOOD PRESSURE: 116 MMHG

## 2022-08-30 DIAGNOSIS — G43.909 MIGRAINE, UNSPECIFIED, NOT INTRACTABLE, W/OUT STATUS MIGRAINOSUS: ICD-10-CM

## 2022-08-30 DIAGNOSIS — Z00.00 ENCOUNTER FOR GENERAL ADULT MEDICAL EXAMINATION W/OUT ABNORMAL FINDINGS: ICD-10-CM

## 2022-08-30 DIAGNOSIS — H35.9 UNSPECIFIED RETINAL DISORDER: ICD-10-CM

## 2022-08-30 DIAGNOSIS — Z82.49 FAMILY HISTORY OF ISCHEMIC HEART DISEASE AND OTHER DISEASES OF THE CIRCULATORY SYSTEM: ICD-10-CM

## 2022-08-30 DIAGNOSIS — A49.9 BACTERIAL INFECTION, UNSPECIFIED: ICD-10-CM

## 2022-08-30 DIAGNOSIS — R00.2 PALPITATIONS: ICD-10-CM

## 2022-08-30 PROCEDURE — 93000 ELECTROCARDIOGRAM COMPLETE: CPT

## 2022-08-30 PROCEDURE — 99205 OFFICE O/P NEW HI 60 MIN: CPT | Mod: 25

## 2022-08-30 RX ORDER — SUMATRIPTAN 50 MG/1
50 TABLET, FILM COATED ORAL
Refills: 0 | Status: ACTIVE | COMMUNITY
Start: 2022-08-30

## 2022-08-30 RX ORDER — MYCOPHENOLATE MOFETIL 200 MG/ML
POWDER, FOR SUSPENSION ORAL
Refills: 0 | Status: ACTIVE | COMMUNITY

## 2022-08-30 RX ORDER — THIAMINE HCL 50 MG
500 TABLET ORAL DAILY
Refills: 0 | Status: ACTIVE | COMMUNITY
Start: 2022-08-30

## 2022-08-30 RX ORDER — ESTRADIOL 10 UG/1
10 TABLET, FILM COATED VAGINAL
Refills: 0 | Status: ACTIVE | COMMUNITY
Start: 2022-08-30

## 2022-08-30 RX ORDER — LORATADINE 10 MG
17 TABLET,DISINTEGRATING ORAL
Refills: 0 | Status: ACTIVE | COMMUNITY
Start: 2022-08-30

## 2022-08-30 RX ORDER — PSYLLIUM SEED
48.57 PACKET (EA) ORAL
Refills: 0 | Status: ACTIVE | COMMUNITY
Start: 2022-08-30

## 2022-08-30 RX ORDER — DOXYCYCLINE HYCLATE 100 MG/1
100 CAPSULE ORAL
Refills: 0 | Status: ACTIVE | COMMUNITY
Start: 2022-08-30

## 2022-08-30 RX ORDER — UBIDECARENONE/VIT E ACET 100MG-5
50 MCG CAPSULE ORAL
Refills: 0 | Status: ACTIVE | COMMUNITY
Start: 2022-08-30

## 2022-08-30 NOTE — DISCUSSION/SUMMARY
[Patient] : the patient [Risks] : risks [Benefits] : benefits [Alternatives] : alternatives [With Me] : with me [___ Month(s)] : in [unfilled] month(s) [FreeTextEntry1] : This is a 62 year old woman with history of hip arhtritis,  on hormone replacement therapy , until feb 2022, Autoimmune disorder of retina, here for palpitations \par \par 1) palpitations:   ressurance./ ECtg normal.   atypical symptoms.  no further cardiac work up is needed. \par 2) Pre-operative cardiovascular risk evaluation and management :  going for hip surgery. olow risk. no cardiac sypmtoms. no ischemic ECG changes. proceed for surgery as indicated.  [EKG obtained to assist in diagnosis and management of assessed problem(s)] : EKG obtained to assist in diagnosis and management of assessed problem(s)

## 2022-08-30 NOTE — HISTORY OF PRESENT ILLNESS
[FreeTextEntry1] : racing heart beat.  and palpitations. \par \par \par \par This is a 62 year old woman with history of hip arhtritis,  on hormone replacement therapy , until feb 2022, Autoimmune disorder of retina, here for palpitations \par \par \par No chest pain . no dyspnea on exertion . on exercise. just rapid heart beat while walking. rapid heart beat going on for 3 days when it was hot. she drank a a lot of water and hdyrated. and she felt better.  \par she was a little anxious at that . no covid symptoms. did not eat something different.  \par No syncope. \par \par family history :\par father : at 92 CHF\par Mother: No heart issues. \par \par no smoking. no alcohol. no drugs.\par

## 2022-08-30 NOTE — REASON FOR VISIT
[Symptom and Test Evaluation] : symptom and test evaluation [FreeTextEntry1] : left hip replacement , palpitaitons an dracing heart .\par

## 2022-10-06 ENCOUNTER — APPOINTMENT (OUTPATIENT)
Dept: DERMATOLOGY | Facility: CLINIC | Age: 62
End: 2022-10-06

## 2022-10-06 PROCEDURE — 99213 OFFICE O/P EST LOW 20 MIN: CPT

## 2022-10-24 NOTE — ASU PREOP CHECKLIST - RESPIRATORY RATE (BREATHS/MIN)
What Type Of Note Output Would You Prefer (Optional)?: Standard Output How Severe Is Your Skin Lesion?: moderate Is This A New Presentation, Or A Follow-Up?: Skin Lesion 17

## 2022-12-29 ENCOUNTER — APPOINTMENT (OUTPATIENT)
Dept: OPHTHALMOLOGY | Facility: CLINIC | Age: 62
End: 2022-12-29

## 2023-07-06 ENCOUNTER — APPOINTMENT (OUTPATIENT)
Dept: DERMATOLOGY | Facility: CLINIC | Age: 63
End: 2023-07-06
Payer: MEDICARE

## 2023-07-06 PROCEDURE — 11102 TANGNTL BX SKIN SINGLE LES: CPT

## 2023-07-06 PROCEDURE — 99214 OFFICE O/P EST MOD 30 MIN: CPT | Mod: 25

## 2023-07-26 ENCOUNTER — APPOINTMENT (OUTPATIENT)
Dept: DERMATOLOGY | Facility: CLINIC | Age: 63
End: 2023-07-26
Payer: MEDICARE

## 2023-07-26 PROCEDURE — 99213 OFFICE O/P EST LOW 20 MIN: CPT | Mod: 25

## 2023-07-26 PROCEDURE — 17000 DESTRUCT PREMALG LESION: CPT

## 2023-10-09 ENCOUNTER — APPOINTMENT (OUTPATIENT)
Dept: DERMATOLOGY | Facility: CLINIC | Age: 63
End: 2023-10-09

## 2024-07-13 ENCOUNTER — NON-APPOINTMENT (OUTPATIENT)
Age: 64
End: 2024-07-13

## 2025-07-16 NOTE — H&P PST ADULT - CONSTITUTIONAL
Preceptor Attestation:   Patient seen, evaluated and discussed with the resident. I have verified the content of the note, which accurately reflects my assessment of the patient and the plan of care. Personally discussed vertebroplasty and med management. She and  appreciated info and will follow up for eval to make a decision. Currently increased oxy from 5mg q4h (6tabs/24h max) to 5-10mg q6h (8tabs/24h max) potentially enabling sleeping through the night and rescue second oxy dose during day. Rx 72 tabs to last 9 days. Plan to see back in 7 days.    Supervising Physician:  Dave Winchester MD         detailed exam

## (undated) DEVICE — XI SEAL UNIV 5- 8 MM

## (undated) DEVICE — WRAP COMPRESSION CALF MED

## (undated) DEVICE — SOL IRR POUR NS 0.9% 1000ML

## (undated) DEVICE — TROCAR COVIDIEN VERSAONE OPTICAL BLADELESS 5MM

## (undated) DEVICE — XI DRAPE COLUMN

## (undated) DEVICE — XI SEALER DA VINCI VESSEL

## (undated) DEVICE — DEVICE PUREVIEW ACTIVE

## (undated) DEVICE — PACK ROBOTIC

## (undated) DEVICE — BLANKET WARMER UPPER ADULT

## (undated) DEVICE — DRAPE ROBOTIC

## (undated) DEVICE — ELCTR CORD FOOTSWITCH 1PLR LAPSCP 10FT

## (undated) DEVICE — SUT NYLON 3-0 18" PS-2

## (undated) DEVICE — ELCTR GROUNDING PAD ADULT COVIDIEN

## (undated) DEVICE — RUMI TIP WHITE 6.7MM X 6CM DISP

## (undated) DEVICE — RUMI TIP LAVENDER 5.1MMX6CM DISP

## (undated) DEVICE — RUMI KOH-EFFICIENT 3.0CM

## (undated) DEVICE — POSITIONER PINK PAD PIGAZZI SYSTEM

## (undated) DEVICE — SOL INJ NS 0.9% 100ML

## (undated) DEVICE — RUMI TIP GREEN 6.7MMX10CM DISP

## (undated) DEVICE — LIGASURE ATLAS 10MM 20CM

## (undated) DEVICE — COVER TIP INTUITIVE W INSERTION TOOL

## (undated) DEVICE — POSITIONER FOAM EGG CRATE ULNAR (2PCS)

## (undated) DEVICE — RUMI TIP BLUE 6.7MM X 8CM DISP

## (undated) DEVICE — LIGASURE BLUNT TIP NANO CTD 37CM

## (undated) DEVICE — XI DRAPE ARM

## (undated) DEVICE — Device

## (undated) DEVICE — SUT VLOC 180 0 9" GS-21 GREEN

## (undated) DEVICE — RUMI TIP ORANGE 6.7MMX12CM DISP

## (undated) DEVICE — GLV 7.5 PROTEXIS

## (undated) DEVICE — SOL IRR POUR H2O 1000ML